# Patient Record
Sex: MALE | Race: BLACK OR AFRICAN AMERICAN | NOT HISPANIC OR LATINO | URBAN - METROPOLITAN AREA
[De-identification: names, ages, dates, MRNs, and addresses within clinical notes are randomized per-mention and may not be internally consistent; named-entity substitution may affect disease eponyms.]

---

## 2017-09-15 PROBLEM — Z00.00 ENCOUNTER FOR PREVENTIVE HEALTH EXAMINATION: Status: ACTIVE | Noted: 2017-09-15

## 2020-09-02 ENCOUNTER — EMERGENCY (EMERGENCY)
Facility: HOSPITAL | Age: 40
LOS: 1 days | Discharge: HOME | End: 2020-09-02
Attending: EMERGENCY MEDICINE | Admitting: EMERGENCY MEDICINE
Payer: COMMERCIAL

## 2020-09-02 VITALS
DIASTOLIC BLOOD PRESSURE: 92 MMHG | RESPIRATION RATE: 15 BRPM | SYSTOLIC BLOOD PRESSURE: 129 MMHG | TEMPERATURE: 98 F | HEART RATE: 78 BPM | OXYGEN SATURATION: 100 %

## 2020-09-02 VITALS
DIASTOLIC BLOOD PRESSURE: 94 MMHG | RESPIRATION RATE: 18 BRPM | SYSTOLIC BLOOD PRESSURE: 130 MMHG | OXYGEN SATURATION: 98 % | TEMPERATURE: 98 F | HEART RATE: 70 BPM

## 2020-09-02 PROCEDURE — 99283 EMERGENCY DEPT VISIT LOW MDM: CPT

## 2020-09-02 NOTE — ED PROVIDER NOTE - CLINICAL SUMMARY MEDICAL DECISION MAKING FREE TEXT BOX
38 y/o M w/ no PMHX presents to ER for RT sided facial droop. Rt sided CN7 motor dysfunction. No extremity motor/sensory deficit noted. Discussed most likely diagnosis of bell's palsy. Will discharge with prednisone 60 mg for 7 days. Will provide information to discharge center for PMD/Neuro follow up. Discussed use of eye patch/eye drops as needed. Return precautions provided. Pt understands and agreeable to plan.

## 2020-09-02 NOTE — ED ADULT NURSE NOTE - OBJECTIVE STATEMENT
Receive pt in rm15, pt A*Ox4, Pt states he woke up Monday morning and realize his face was twisted, pt thought it would resolve on its own as per pt he presents to the ED because it has not resolves. Pt denies numbness or tingling  when the facial droop started or now. Pt denies blurry vision, neck pain, chills, fever. Pt endorses slight headache  3/10. NSR on cardiac monitor. Receive pt in rm15, pt A*Ox4, Pt states he woke up Monday morning and realize his face was twisted, pt thought it would resolve on its own as per pt he presents to the ED because symptoms  has not resolves. Pt denies numbness or tingling  in extremities or face when the facial droop started or now. Pt denies blurry vision, neck pain, chills, fever, denies pain with chewing. Pt denies rashes or working outside.   Pt endorses slight headache  3/10. Lungs clear, PERRLA, Motor strength equal in UE ans LE. Sensation present on B/L face, UE and LE. No rash noted. NSR on cardiac monitor. Receive pt in rm15, pt A*Ox4, Pt states he woke up Monday morning and realize his face was twisted, pt thought it would resolve on its own as per pt he presents to the ED because symptoms  has not resolves. Pt denies numbness or tingling  in extremities or face when the facial droop started or now. Pt denies blurry vision, neck pain, chills, fever, denies pain with chewing, excessive tearing, ringing in ears. Pt denies rashes or working outside.   Pt endorses slight headache  3/10. Lungs clear, PERRLA, Motor strength equal in UE ans LE. Sensation present on B/L face, UE and LE. No rash noted. NSR on cardiac monitor.

## 2020-09-02 NOTE — ED PROVIDER NOTE - PATIENT PORTAL LINK FT
You can access the FollowMyHealth Patient Portal offered by [facility] by registering at the following website: [email or web address]. By joining [facility]’s FollowMyHealth portal, you will also be able to view your health information using other applications (apps) compatible with our system.

## 2020-09-02 NOTE — ED PROVIDER NOTE - ATTENDING CONTRIBUTION TO CARE
38yo M with no PMHx, no recent outdoor activities, p/w 3 days of R sided facial droop that he woke up with, no numbness or tingling to face, no weakness or tingling in extremities, vision changes, taste or auditory changes, rashes, fevers, or other complaints    General: Patient in no apparent distress, AAO x 3  Skin: Dry and intact. no rash  HEENT: Head atraumatic. Oral mucosa moist. No pharyngeal exudates or tonsillar enlargement  Eyes: Conjunctiva normal  Cardiac: Regular rhythm and rate. No pretibial edema b/l  Respiratory: Lungs clear b/l and symmetric. No respiratory distress. Able to speak in complete sentences.  Gastrointestinal: Abdomen soft, nondistended, nontender  Musculoskeletal: Moves all extremities spontaneously  Neurological: alert and oriented to person, place, and time. R CN 7 palsy, otherwise CN 2-12 grossly intact  Psychiatric: Cooperative    a/p  [place] palsy  d/w patient in detail causes of bells palsy and expectations with going forward  will dc with pmd f/u and rx for prednisone x 1 week, eye covering at night, eye drops for lubrication  return precautions for new or worsening symptoms

## 2020-09-02 NOTE — ED PROVIDER NOTE - PHYSICAL EXAMINATION
Vital signs reviewed.   CONSTITUTIONAL: Well-appearing; well-nourished; in no apparent distress. Non-toxic appearing.   HEAD: Normocephalic, atraumatic.  EYES: PERRL, EOM intact, conjunctiva and no sclera injection noted. Able to close eye completely.   ENT: normal nose; no rhinorrhea; normal pharynx with no tonsillar hypertrophy, no erythema, no exudate  NECK/LYMPH: Supple; non-tender  CARD: Normal S1, S2  RESP: Normal chest excursion with respiration; breath sounds clear and equal bilaterally; no wheezes, rhonchi, or rales.  ABD/GI: soft, non-distended; non-tender  EXT/MS: moves all extremities; distal pulses are normal, no pedal edema.  SKIN: Normal for age and race; warm; dry; good turgor; no apparent lesions/rashes or exudate noted.   NEURO: Awake, alert, oriented x 3, no gross deficits, CN II-XII grossly intact, no motor or sensory deficit noted. Rapid alt movement intact.  CN7 Rt sided motor palsy.   PSYCH: Normal mood; appropriate affect.

## 2020-09-02 NOTE — ED ADULT TRIAGE NOTE - CHIEF COMPLAINT QUOTE
pt woke up with left sided. facial droop on Monday.. speech is clear. Denies any headache or dizziness or weakness or numbness or fever. strength equal bilateral extremities.  No PMH and is not on any medications.

## 2020-09-02 NOTE — ED PROVIDER NOTE - NSFOLLOWUPINSTRUCTIONS_ED_ALL_ED_FT
Sr’s Palsy    Bell’s palsy is a condition in which the muscles on one side of the face become paralyzed. This often causes one side of the face to droop. It is a common condition and many people recover completely. Causes include viral infections but most of the time the reason remains unknown. Signs and symptoms include not being able to raise your eyebrow, not being able to close your eye, drooping of the eyelid and corner of the mouth, sensitivity to loud noises, dryness of the eye, change in taste, and not being able to close your mouth and drooling. Take medicines only as directed by your health care provider. If your eye is affected, use moisturizing eye drops to prevent drying of your eye and tape your eyelid shut at night.    SEEK IMMEDIATE MEDICAL CARE IF YOU HAVE ANY OF THE FOLLOWING SYMPTOMS: weakness or numbness in another part of your body, difficulty swallowing, fever, or neck pain.

## 2020-09-02 NOTE — ED PROVIDER NOTE - OBJECTIVE STATEMENT
38 y/o M w/ no PMHX presents to ER for RT sided facial droop. Awoke 3 days ago with Rt side of mouth leaning to one side. Notices droop when talking and laughing. Admits to Rt sided temporal headache 2/10 and minimal pain. Does not wear corrective lenses or contacts. Able to close both eyes without difficulty and no complaints with sleep. Has not utilized eye drops or eye patch. Denies hx of HSV. Denies recent travel, gardening, outdoor activity, camping, chest pain, palpitations, shortness of breath, abdominal pain, nausea/vomiting, body aches, constipation or diarrhea.

## 2020-09-02 NOTE — ED PROVIDER NOTE - NS ED ROS FT
Constitutional: (-) fever (-) vomiting  Head: Normal cephalic, Atraumatic  Eyes/ENT: (-) vision changes, (-) hearing changes  Cardiovascular: (-) chest pain, (-) wheezing  Respiratory: (-) cough, (-) shortness of breath  Gastrointestinal: (-) vomiting, (-) diarrhea, (-) abdominal pain  : (-) dysuria   Musculoskeletal: (-) back pain  Integumentary: (-) rash, (-) edema  Neurological: (-)loc (+) headache (+) Rt sided facial droop  Allergic/Immunologic: (-) pruritus

## 2020-09-03 ENCOUNTER — APPOINTMENT (OUTPATIENT)
Dept: NEUROLOGY | Facility: CLINIC | Age: 40
End: 2020-09-03

## 2020-09-22 ENCOUNTER — APPOINTMENT (OUTPATIENT)
Dept: INTERNAL MEDICINE | Facility: CLINIC | Age: 40
End: 2020-09-22

## 2020-10-22 ENCOUNTER — APPOINTMENT (OUTPATIENT)
Dept: NEUROLOGY | Facility: CLINIC | Age: 40
End: 2020-10-22

## 2020-10-26 ENCOUNTER — APPOINTMENT (OUTPATIENT)
Dept: NEUROLOGY | Facility: CLINIC | Age: 40
End: 2020-10-26
Payer: COMMERCIAL

## 2020-10-26 VITALS
WEIGHT: 160 LBS | HEART RATE: 86 BPM | BODY MASS INDEX: 24.25 KG/M2 | DIASTOLIC BLOOD PRESSURE: 75 MMHG | HEIGHT: 68 IN | SYSTOLIC BLOOD PRESSURE: 112 MMHG

## 2020-10-26 VITALS — TEMPERATURE: 97.7 F

## 2020-10-26 DIAGNOSIS — G51.0 BELL'S PALSY: ICD-10-CM

## 2020-10-26 PROCEDURE — 99204 OFFICE O/P NEW MOD 45 MIN: CPT

## 2020-10-26 PROCEDURE — 99072 ADDL SUPL MATRL&STAF TM PHE: CPT

## 2020-10-27 PROBLEM — G51.0 LEFT-SIDED BELL'S PALSY: Status: ACTIVE | Noted: 2020-10-27

## 2020-10-27 NOTE — PHYSICAL EXAM
[FreeTextEntry1] : PHYSICAL EXAM\par Constitutional: Alert, no acute distress \par Neck: no masses, full range of motion\par Psychiatric: appropriate affect and mood\par Pulmonary: No respiratory distress, stable on room air\par Cardiac: Normal rate/rhythm\par Abdomen: soft, non-distended\par Skin: No rash, no skin lesions\par NEUROLOGICAL EXAM\par Mental status: The patient is alert, attentive, and oriented.\par Speech: clear and fluent with good repetition, comprehension, and naming\par Cranial nerves:\par CN II: Visual fields are full to confrontation. Pupil size equal and briskly reactive to light. \par CN III, IV, VI: EOMI, no nystagmus, no ptosis\par CN V: Facial sensation is intact to pinprick in all 3 divisions bilaterally.\par CN VII: Face is symmetric with normal eye closure and smile, slight weakness on puffing cheeks on the left.\par CN VII: Hearing is normal to rubbing fingers\par CN IX, X: Palate elevates symmetrically. Phonation is normal.\par CN XI: Head turning and shoulder shrug are intact\par CN XII: Tongue is midline with normal movements and no atrophy.\par Motor: There is no pronator drift of out-stretched arms. Muscle bulk and tone are normal. Strength is full bilaterally. \par 5/5 muscle power at bilat: Deltoid, Biceps, Triceps, Wrist ext, Finger abd, Hip flex, Hip ext, Knee flex, Knee ext, Ankle flex, Ankle ext\par Reflexes: Reflexes are 2+ and symmetric at the biceps, triceps, knees, and ankles. Plantar responses are flexor.\par Sensory: Light touch, pinprick, position sense, and vibration sense are intact in fingers and toes.\par Coordination:\par Rapid alternating movements and fine finger movements are intact. There is no dysmetria on finger-to-nose and heel-knee-shin. There are no abnormal or extraneous movements. Romberg is absent.\par Gait/Stance:\par Posture is normal. Gait is steady with normal steps, base, arm swing, and turning. Heel and toe walking are normal. Tandem gait is normal \par \par \par \par \par

## 2020-10-27 NOTE — HISTORY OF PRESENT ILLNESS
[FreeTextEntry1] : Boo was seen at [facility] ED on 9/2/202 with left facial weakness x 3 days. \par He was noted to have lower motor neuron facial weakness and diagnosed with bells palsy and started on PO steroids x 7 days. \par He reports waking up one morning with the facial weakness. He denies any associated pain or facial rash. He reports "a very light headache" over the left temple. He denies any vision changes. \par He says his weakness is now 90% better. He denied any facial numbness. He denies any weakness or numbness one side of body.\par He denies any similar symptoms in the past. He denies any new symptoms. He denies any alterations of taste.

## 2020-10-27 NOTE — ASSESSMENT
[FreeTextEntry1] : Assessment/Plan:\par  40 year male with no significant pmhx, developed left sided facial weakness the beginning of September 2020. He was evaluated at [facility] ED and diagnosed with bell palsy and prescribed course of steroids. His symptoms are now 90% better with only mild residual facial weakness. He denies any new neurological symptoms or alteration of taste. He denies any headaches. He denies any facial rash or tick bite. His neurological exam is otherwise normal. The presentation is most consistent with bells palsy. I explained to patient that I suspect the symptoms will continue to improve over the next few weeks. I advised him to call our clinic if symptoms returned or if he developed any new neurological symptoms, in which case he was recommended to make an urgent clinic appointment. \par \par YESIKA KIM expressed understanding and all his questions were answered.\par

## 2022-02-03 ENCOUNTER — TRANSCRIPTION ENCOUNTER (OUTPATIENT)
Age: 42
End: 2022-02-03

## 2022-06-28 ENCOUNTER — NON-APPOINTMENT (OUTPATIENT)
Age: 42
End: 2022-06-28

## 2023-02-02 ENCOUNTER — NON-APPOINTMENT (OUTPATIENT)
Age: 43
End: 2023-02-02

## 2024-05-12 ENCOUNTER — NON-APPOINTMENT (OUTPATIENT)
Age: 44
End: 2024-05-12